# Patient Record
Sex: MALE | Race: WHITE | Employment: PART TIME | URBAN - METROPOLITAN AREA
[De-identification: names, ages, dates, MRNs, and addresses within clinical notes are randomized per-mention and may not be internally consistent; named-entity substitution may affect disease eponyms.]

---

## 2017-01-04 RX ORDER — VALSARTAN AND HYDROCHLOROTHIAZIDE 320; 25 MG/1; MG/1
TABLET, FILM COATED ORAL
Qty: 90 TAB | Refills: 0 | Status: SHIPPED | OUTPATIENT
Start: 2017-01-04 | End: 2017-04-02 | Stop reason: SDUPTHER

## 2017-01-16 ENCOUNTER — OFFICE VISIT (OUTPATIENT)
Dept: FAMILY MEDICINE CLINIC | Age: 57
End: 2017-01-16

## 2017-01-16 VITALS
DIASTOLIC BLOOD PRESSURE: 72 MMHG | SYSTOLIC BLOOD PRESSURE: 124 MMHG | OXYGEN SATURATION: 98 % | HEART RATE: 73 BPM | HEIGHT: 72 IN | BODY MASS INDEX: 30.07 KG/M2 | TEMPERATURE: 98.8 F | RESPIRATION RATE: 16 BRPM | WEIGHT: 222 LBS

## 2017-01-16 DIAGNOSIS — K21.9 GASTROESOPHAGEAL REFLUX DISEASE, ESOPHAGITIS PRESENCE NOT SPECIFIED: ICD-10-CM

## 2017-01-16 DIAGNOSIS — C90.01 MULTIPLE MYELOMA IN REMISSION (HCC): ICD-10-CM

## 2017-01-16 DIAGNOSIS — Z12.5 PROSTATE CANCER SCREENING: ICD-10-CM

## 2017-01-16 DIAGNOSIS — I10 ESSENTIAL HYPERTENSION: Primary | ICD-10-CM

## 2017-01-16 DIAGNOSIS — E03.9 ACQUIRED HYPOTHYROIDISM: ICD-10-CM

## 2017-01-16 RX ORDER — LEVOTHYROXINE SODIUM 25 UG/1
25 TABLET ORAL DAILY
COMMUNITY
Start: 2016-11-18 | End: 2017-05-02

## 2017-01-16 RX ORDER — ASPIRIN 81 MG/1
81 TABLET ORAL DAILY
COMMUNITY

## 2017-01-16 RX ORDER — LENALIDOMIDE 25 MG/1
25 CAPSULE ORAL DAILY
COMMUNITY
Start: 2017-01-05

## 2017-01-16 RX ORDER — TRIAMTERENE/HYDROCHLOROTHIAZID 37.5-25 MG
0.5 TABLET ORAL DAILY
Qty: 45 TAB | Refills: 1 | Status: SHIPPED | OUTPATIENT
Start: 2017-01-16 | End: 2017-05-02

## 2017-01-16 RX ORDER — ACYCLOVIR 400 MG/1
400 TABLET ORAL 2 TIMES DAILY
COMMUNITY
Start: 2017-01-05

## 2017-01-16 RX ORDER — TRIAMTERENE/HYDROCHLOROTHIAZID 37.5-25 MG
TABLET ORAL
COMMUNITY
Start: 2016-11-18 | End: 2017-01-16

## 2017-01-16 NOTE — MR AVS SNAPSHOT
Visit Information Date & Time Provider Department Dept. Phone Encounter #  
 1/16/2017  8:30 AM Ciara Turk MD [address] [place] [address] Road [id number] Follow-up Instructions Return in about 6 months (around 7/16/2017) for  routine care and please have non-fasting labs done 1 week prior. Upcoming Health Maintenance Date Due Pneumococcal 19-64 Highest Risk (1 of 3 - PCV13) 12/10/1978 COLONOSCOPY 3/8/2017 DTaP/Tdap/Td series (2 - Td) 7/6/2025 Allergies as of 1/16/2017  Review Complete On: 1/16/2017 By: Brad Rank Severity Noted Reaction Type Reactions Ativan [Lorazepam]  01/26/2016    Other (comments) Suicidal thoughts Current Immunizations  Reviewed on 7/5/2016 Name Date Tdap 7/6/2015 Not reviewed this visit You Were Diagnosed With   
  
 Codes Comments Essential hypertension    -  Primary ICD-10-CM: I10 
ICD-9-CM: 401.9 Acquired hypothyroidism     ICD-10-CM: E03.9 ICD-9-CM: 244.9 Multiple myeloma in remission ([facility] [address]     ICD-10-CM: C90.01 
ICD-9-CM: 203.01 Gastroesophageal reflux disease, esophagitis presence not specified     ICD-10-CM: K21.9 ICD-9-CM: 530.81 Prostate cancer screening     ICD-10-CM: Z12.5 ICD-9-CM: V76.44 Vitals BP Pulse Temp Resp Height(growth percentile) Weight(growth percentile) 124/72 (BP 1 Location: Left arm, BP Patient Position: Sitting) 73 98.8 °F (37.1 °C) (Oral) 16 6' (1.829 m) 222 lb (100.7 kg) SpO2 BMI Smoking Status 98% 30.11 kg/m2 Former Smoker Vitals History BMI and BSA Data Body Mass Index Body Surface Area  
 30.11 kg/m 2 2.26 m 2 Preferred Pharmacy Pharmacy Name Phone [address] [facility] [facility] [phone] Your Updated Medication List  
  
   
This list is accurate as of: 1/16/17  9:09 AM.  Always use your most recent med list.  
  
  
  
  
 acyclovir 400 mg tablet Commonly known as:  ZOVIRAX Take 400 mg by mouth two (2) times a day. amitriptyline 25 mg tablet Commonly known as:  ELAVIL Take 25 mg by mouth nightly. aspirin delayed-release 81 mg tablet Take 81 mg by mouth daily. HYDROcodone-acetaminophen 7.5-325 mg per tablet Commonly known as:  Pelon Songster Take 1-2 Tabs by mouth every six (6) hours as needed. lansoprazole 15 mg capsule Commonly known as:  PREVACID Take  by mouth two (2) times a day. levothyroxine 25 mcg tablet Commonly known as:  SYNTHROID Take 25 mcg by mouth daily. P-COL RITE 8.6-50 mg per tablet Generic drug:  senna-docusate Take 1 Tab by mouth daily. REVLIMID 25 mg Cap Generic drug:  lenalidomide Take 25 mg by mouth daily. tiZANidine 4 mg tablet Commonly known as:  Tara Mail Take 4 mg by mouth two (2) times daily as needed. triamterene-hydroCHLOROthiazide 37.5-25 mg per tablet Commonly known as:  Jose Nabila Take 0.5 Tabs by mouth daily. valsartan-hydroCHLOROthiazide 320-25 mg per tablet Commonly known as:  DIOVAN-HCT  
TAKE 1 TABLET DAILY Prescriptions Printed Refills  
 triamterene-hydroCHLOROthiazide (MAXZIDE) 37.5-25 mg per tablet 1 Sig: Take 0.5 Tabs by mouth daily. Class: Print Route: Oral  
  
Follow-up Instructions Return in about 6 months (around 7/16/2017) for  routine care and please have non-fasting labs done 1 week prior. To-Do List   
 01/16/2017 Lab:  CBC W/O DIFF   
  
 01/16/2017 Lab:  METABOLIC PANEL, COMPREHENSIVE   
  
 01/16/2017 Lab:  PSA DIAGNOSTIC (PROSTATIC SPECIFIC AG)   
  
 01/16/2017 Lab:  TSH 3RD GENERATION Patient Instructions A Healthy Lifestyle: Care Instructions Your Care Instructions A healthy lifestyle can help you feel good, stay at a healthy weight, and have plenty of energy for both work and play.  A healthy lifestyle is something you can share with your whole family. A healthy lifestyle also can lower your risk for serious health problems, such as high blood pressure, heart disease, and diabetes. You can follow a few steps listed below to improve your health and the health of your family. Follow-up care is a key part of your treatment and safety. Be sure to make and go to all appointments, and call your doctor if you are having problems. Its also a good idea to know your test results and keep a list of the medicines you take. How can you care for yourself at home? · Do not eat too much sugar, fat, or fast foods. You can still have dessert and treats now and then. The goal is moderation. · Start small to improve your eating habits. Pay attention to portion sizes, drink less juice and soda pop, and eat more fruits and vegetables. ¨ Eat a healthy amount of food. A 3-ounce serving of meat, for example, is about the size of a deck of cards. Fill the rest of your plate with vegetables and whole grains. ¨ Limit the amount of soda and sports drinks you have every day. Drink more water when you are thirsty. ¨ Eat at least 5 servings of fruits and vegetables every day. It may seem like a lot, but it is not hard to reach this goal. A serving or helping is 1 piece of fruit, 1 cup of vegetables, or 2 cups of leafy, raw vegetables. Have an apple or some carrot sticks as an afternoon snack instead of a candy bar. Try to have fruits and/or vegetables at every meal. 
· Make exercise part of your daily routine. You may want to start with simple activities, such as walking, bicycling, or slow swimming. Try to be active 30 to 60 minutes every day. You do not need to do all 30 to 60 minutes all at once. For example, you can exercise 3 times a day for 10 or 20 minutes.  Moderate exercise is safe for most people, but it is always a good idea to talk to your doctor before starting an exercise program. 
 · Keep moving. [place] the lawn, work in the garden, or Harry and David. Take the stairs instead of the elevator at work. · If you smoke, quit. People who smoke have an increased risk for heart attack, stroke, cancer, and other lung illnesses. Quitting is hard, but there are ways to boost your chance of quitting tobacco for good. ¨ Use nicotine gum, patches, or lozenges. ¨ Ask your doctor about stop-smoking programs and medicines. ¨ Keep trying. In addition to reducing your risk of diseases in the future, you will notice some benefits soon after you stop using tobacco. If you have shortness of breath or asthma symptoms, they will likely get better within a few weeks after you quit. · Limit how much alcohol you drink. Moderate amounts of alcohol (up to 2 drinks a day for men, 1 drink a day for women) are okay. But drinking too much can lead to liver problems, high blood pressure, and other health problems. Family health If you have a family, there are many things you can do together to improve your health. · Eat meals together as a family as often as possible. · Eat healthy foods. This includes fruits, vegetables, lean meats and dairy, and whole grains. · Include your family in your fitness plan. Most people think of activities such as jogging or tennis as the way to fitness, but there are many ways you and your family can be more active. Anything that makes you breathe hard and gets your heart pumping is exercise. Here are some tips: 
¨ Walk to do errands or to take your child to school or the bus. ¨ Go for a family bike ride after dinner instead of watching TV. Where can you learn more? Go to http://moises-venus.info/. Enter Q329 in the search box to learn more about \"A Healthy Lifestyle: Care Instructions. \" Current as of: July 26, 2016 Content Version: 11.1 © 8502-9591 Mirimus, Incorporated.  Care instructions adapted under license by Delilah Francisco (which disclaims liability or warranty for this information). If you have questions about a medical condition or this instruction, always ask your healthcare professional. [address] any warranty or liability for your use of this information. Follow up in 6 months for routine care and please have non-fasting labs done 1 week prior Introducing [facility]! Dear Yogi Reyes: Thank you for requesting a [facility] account. Our records indicate that you already have an active [facility] account. You can access your account anytime at [email or web address] Did you know that you can access your hospital and ER discharge instructions at any time in [facility]? You can also review all of your test results from your hospital stay or ER visit. Additional Information If you have questions, please visit the Frequently Asked Questions section of the [facility] website at [email or web address] Remember, [facility] is NOT to be used for urgent needs. For medical emergencies, dial 911. Now available from your iPhone and Android! Please provide this summary of care documentation to your next provider. Your primary care clinician is listed as Samuel Patton. If you have any questions after today's visit, please call [phone].

## 2017-01-16 NOTE — PROGRESS NOTES
SUBJECTIVE  Chief Complaint   Patient presents with    Other     multiple myeloma     Hypertension    Thyroid Problem     Here for general follow-up. He is under the care of Dr. Villa Chacon for multiple myeloma. He says that he is in remission. He has minimal pain. He is taking meds for such. He is having q6 week infusions. He had swelling and was taken of Norvasc. His oncologist started him on Maxzide. He is already on Diovan / HCT. He says that he was found to have an elevated TSH and was placed on Synthroid. We reviewed their cardiac risk factors. The patient has been taking medications as prescribed regularly and denies any side effects with the medication. The patient denies any chest pain or chest tightness. Denies any dyspnea upon exertion. He also says that he has had GERD well controlled on chronic PPI therapy. OBJECTIVE    Blood pressure 124/72, pulse 73, temperature 98.8 °F (37.1 °C), temperature source Oral, resp. rate 16, height 6' (1.829 m), weight 222 lb (100.7 kg), SpO2 98 %. General:  Alert, cooperative, well appearing, in no apparent distress. CV:  The heart sounds are regular in rate and rhythm. There is a normal S1 and S2. There or no murmurs. Lungs: Inspiratory and expiratory efforts are full and unlabored. Lung sounds are clear and equal to auscultation throughout all lung fields without wheezing, rales, or rhonchi. Ext: no swelling. Psych: normal affect. Mood good. Oriented x 3. Judgement and insight intact.      Results for orders placed or performed during the hospital encounter of 09/06/16   SPEP AND ARSALAN, SERUM   Result Value Ref Range    Immunoglobulin G, Qt. 913 700 - 1600 mg/dL    Immunoglobulin A, Qt. 97 90 - 386 mg/dL    Immunoglobulin M, Qt. 30 20 - 172 mg/dL    Protein, total 5.7 (L) 6.0 - 8.5 g/dL    Albumin 3.1 2.9 - 4.4 g/dL    Alpha-1-Globulin 0.2 0.0 - 0.4 g/dL    Alpha-2-Globulin 0.6 0.4 - 1.0 g/dL    Beta Globulin 0.9 0.7 - 1.3 g/dL    Gamma Globulin 0.8 0.4 - 1.8 g/dL    M-Jesus 0.6 (H) Not Observed g/dL    Globulin, total 2.6 2.2 - 3.9 g/dL    A/G ratio 1.2 0.7 - 1.7      Immunofixation Result Comment     PROTEIN, TOTAL   Result Value Ref Range    Protein, total 6.1 (L) 6.4 - 8.2 g/dL   PROTEIN ELECTROPHORESIS, URINE RANDOM   Result Value Ref Range    Protein, urine random 18.5 Not Estab. mg/dL    Albumin, urine 23.3 %    Alpha-1-Globulin, urine 3.0 %    Alpha-2-Globulin, urine 11.8 %    Beta Globulin, urine 39.3 %    Gamma Globulin, urine 22.7 %    M-Jesus, % 18.9 (H) Not Observed %    Please note Comment       ASSESSMENT / PLAN    ICD-10-CM ICD-9-CM    1. Essential hypertension H14 363.5 METABOLIC PANEL, COMPREHENSIVE      CBC W/O DIFF   2. Acquired hypothyroidism E03.9 244.9 TSH 3RD GENERATION   3. Multiple myeloma in remission (HCC) G81.59 253.83 METABOLIC PANEL, COMPREHENSIVE      CBC W/O DIFF   4. Gastroesophageal reflux disease, esophagitis presence not specified K21.9 530.81    5. Prostate cancer screening Z12.5 V76.44 PSA DIAGNOSTIC (PROSTATIC SPECIFIC AG)     HTN - well controlled. Continue current care but we will cut his Maxzide in half and recheck his BP in 6 months. Hypothyroid - TSH in 6 months. He wants to get off as many meds as possible. We can see what his TSH is. Multiple myeloma - continue per oncology. GERD - continue PPI. All chart history elements were reviewed by me at the time of the visit even though marked at time of note closure. Patient understands our medical plan. Patient has provided input and agrees with goals. Alternatives have been explained and offered. All questions answered. The patient is to call if condition worsens or fails to improve. Follow-up Disposition:  Return in about 6 months (around 7/16/2017) for  routine care and please have non-fasting labs done 1 week prior.

## 2017-01-16 NOTE — PATIENT INSTRUCTIONS

## 2017-01-16 NOTE — PROGRESS NOTES
1. Have you been to the ER, urgent care clinic since your last visit? Hospitalized since your last visit? No    2. Have you seen or consulted any other health care providers outside of the [address] [place] [address] since your last visit? Include any pap smears or colon screening. Yes When: 09- Where: Dr. Ewelina Buck (Neuro)  Reason for visit: follow peripheral neuropathy and on 12- Hematology/Oncology (Dr. Wan Cho) for folllow up mutiple myeloma     3. Yogi Reyes declined yearly influenza vaccine and he no longer is taken amlodipine 10 mg, decadron 4 mg, and P-Col Rite 8.6-50 mg.  Please remove from medication list.

## 2017-04-04 RX ORDER — VALSARTAN AND HYDROCHLOROTHIAZIDE 320; 25 MG/1; MG/1
TABLET, FILM COATED ORAL
Qty: 90 TAB | Refills: 0 | Status: SHIPPED | OUTPATIENT
Start: 2017-04-04 | End: 2017-07-03 | Stop reason: SDUPTHER

## 2017-05-02 ENCOUNTER — OFFICE VISIT (OUTPATIENT)
Dept: FAMILY MEDICINE CLINIC | Age: 57
End: 2017-05-02

## 2017-05-02 ENCOUNTER — HOSPITAL ENCOUNTER (OUTPATIENT)
Dept: GENERAL RADIOLOGY | Age: 57
Discharge: HOME | End: 2017-05-02
Payer: OTHER GOVERNMENT

## 2017-05-02 VITALS
WEIGHT: 208 LBS | TEMPERATURE: 98.4 F | DIASTOLIC BLOOD PRESSURE: 68 MMHG | OXYGEN SATURATION: 97 % | SYSTOLIC BLOOD PRESSURE: 118 MMHG | HEART RATE: 75 BPM | HEIGHT: 72 IN | RESPIRATION RATE: 14 BRPM | BODY MASS INDEX: 28.17 KG/M2

## 2017-05-02 DIAGNOSIS — C90.01 MULTIPLE MYELOMA IN REMISSION (HCC): ICD-10-CM

## 2017-05-02 DIAGNOSIS — D72.819 LEUKOPENIA, UNSPECIFIED TYPE: ICD-10-CM

## 2017-05-02 DIAGNOSIS — J40 BRONCHITIS: ICD-10-CM

## 2017-05-02 DIAGNOSIS — J40 BRONCHITIS: Primary | ICD-10-CM

## 2017-05-02 PROCEDURE — 71020 XR CHEST PA LAT: CPT

## 2017-05-02 RX ORDER — AZITHROMYCIN 250 MG/1
TABLET, FILM COATED ORAL
Qty: 6 TAB | Refills: 0 | Status: SHIPPED | OUTPATIENT
Start: 2017-05-02 | End: 2017-05-07

## 2017-05-02 RX ORDER — ALBUTEROL SULFATE 90 UG/1
2 AEROSOL, METERED RESPIRATORY (INHALATION)
Qty: 1 INHALER | Refills: 0 | Status: SHIPPED | OUTPATIENT
Start: 2017-05-02 | End: 2018-09-13

## 2017-05-02 NOTE — MR AVS SNAPSHOT
Visit Information Date & Time Provider Department Dept. Phone Encounter #  
 5/2/2017  3:45 PM Kameron Sanders, [address] [id number] Follow-up Instructions Return in about 6 weeks (around 6/16/2017) for  routine care and please have chest x-ray done today (05-). Upcoming Health Maintenance Date Due Pneumococcal 19-64 Highest Risk (1 of 3 - PCV13) 12/10/1978 COLONOSCOPY 3/8/2017 INFLUENZA AGE 9 TO ADULT 8/1/2017 DTaP/Tdap/Td series (2 - Td) 7/6/2025 Allergies as of 5/2/2017  Review Complete On: 5/2/2017 By: Kameron Sanders MD  
  
 Severity Noted Reaction Type Reactions Ativan [Lorazepam]  01/26/2016    Other (comments) Suicidal thoughts Current Immunizations  Reviewed on 7/5/2016 Name Date Tdap 7/6/2015 Not reviewed this visit You Were Diagnosed With   
  
 Codes Comments Bronchitis    -  Primary ICD-10-CM: T01 ICD-9-CM: 839 Multiple myeloma in remission ([facility] [address]     ICD-10-CM: C90.01 
ICD-9-CM: 203.01 Leukopenia, unspecified type     ICD-10-CM: D72.819 ICD-9-CM: 288.50 Vitals BP Pulse Temp Resp Height(growth percentile) Weight(growth percentile)  
 118/68 (BP 1 Location: Left arm, BP Patient Position: Sitting) 75 98.4 °F (36.9 °C) (Oral) 14 6' (1.829 m) 208 lb (94.3 kg) SpO2 BMI Smoking Status 97% 28.21 kg/m2 Former Smoker Vitals History BMI and BSA Data Body Mass Index Body Surface Area  
 28.21 kg/m 2 2.19 m 2 Preferred Pharmacy Pharmacy Name Phone [facility], [address] [phone] Your Updated Medication List  
  
   
This list is accurate as of: 5/2/17  4:16 PM.  Always use your most recent med list.  
  
  
  
  
 acyclovir 400 mg tablet Commonly known as:  ZOVIRAX Take 400 mg by mouth two (2) times a day. albuterol 90 mcg/actuation inhaler Commonly known as:  PROVENTIL HFA, VENTOLIN HFA, PROAIR HFA Take 2 Puffs by inhalation every four (4) hours as needed for Wheezing. amitriptyline 25 mg tablet Commonly known as:  ELAVIL Take 50 mg by mouth nightly. aspirin delayed-release 81 mg tablet Take 81 mg by mouth daily. azithromycin 250 mg tablet Commonly known as:  Silvia Mare Take 2 tablets today, then take 1 tablet daily HYDROcodone-acetaminophen 7.5-325 mg per tablet Commonly known as:  Sy Park Take 1-2 Tabs by mouth every six (6) hours as needed. lansoprazole 15 mg capsule Commonly known as:  PREVACID Take  by mouth two (2) times a day. REVLIMID 25 mg Cap Generic drug:  lenalidomide Take 25 mg by mouth daily. tiZANidine 4 mg tablet Commonly known as:  Vaughn Joseph Take 4 mg by mouth two (2) times daily as needed. valsartan-hydroCHLOROthiazide 320-25 mg per tablet Commonly known as:  DIOVAN-HCT  
TAKE 1 TABLET DAILY Prescriptions Sent to Pharmacy Refills  
 azithromycin (ZITHROMAX) 250 mg tablet 0 Sig: Take 2 tablets today, then take 1 tablet daily Class: Normal  
 Pharmacy: [facility] [place] [address] Ph #: [phone]  
 albuterol (PROVENTIL HFA, VENTOLIN HFA, PROAIR HFA) 90 mcg/actuation inhaler 0 Sig: Take 2 Puffs by inhalation every four (4) hours as needed for Wheezing. Class: Normal  
 Pharmacy: [id number] [address] [place] [address] Ph #: [phone] Route: Inhalation Follow-up Instructions Return in about 6 weeks (around 6/16/2017) for  routine care and please have chest x-ray done today (05-). To-Do List   
 05/02/2017 Imaging:  XR CHEST PA LAT Patient Instructions Bronchitis: Care Instructions Your Care Instructions Bronchitis is inflammation of the bronchial tubes, which carry air to the lungs. The tubes swell and produce mucus, or phlegm. The mucus and inflamed bronchial tubes make you cough. You may have trouble breathing. Most cases of bronchitis are caused by viruses like those that cause colds. Antibiotics usually do not help and they may be harmful. Bronchitis usually develops rapidly and lasts about 2 to 3 weeks in otherwise healthy people. Follow-up care is a key part of your treatment and safety. Be sure to make and go to all appointments, and call your doctor if you are having problems. It's also a good idea to know your test results and keep a list of the medicines you take. How can you care for yourself at home? · Take all medicines exactly as prescribed. Call your doctor if you think you are having a problem with your medicine. · Get some extra rest. 
· Take an over-the-counter pain medicine, such as acetaminophen (Tylenol), ibuprofen (Advil, Motrin), or naproxen (Aleve) to reduce fever and relieve body aches. Read and follow all instructions on the label. · Do not take two or more pain medicines at the same time unless the doctor told you to. Many pain medicines have acetaminophen, which is Tylenol. Too much acetaminophen (Tylenol) can be harmful. · Take an over-the-counter cough medicine that contains dextromethorphan to help quiet a dry, hacking cough so that you can sleep. Avoid cough medicines that have more than one active ingredient. Read and follow all instructions on the label. · Breathe moist air from a humidifier, hot shower, or sink filled with hot water. The heat and moisture will thin mucus so you can cough it out. · Do not smoke. Smoking can make bronchitis worse. If you need help quitting, talk to your doctor about stop-smoking programs and medicines. These can increase your chances of quitting for good. When should you call for help? Call 911 anytime you think you may need emergency care. For example, call if: 
· You have severe trouble breathing. Call your doctor now or seek immediate medical care if: 
· You have new or worse trouble breathing. · You cough up dark brown or bloody mucus (sputum). · You have a new or higher fever. · You have a new rash. Watch closely for changes in your health, and be sure to contact your doctor if: 
· You cough more deeply or more often, especially if you notice more mucus or a change in the color of your mucus. · You are not getting better as expected. Where can you learn more? Go to http://moises-venus.info/. Enter H333 in the search box to learn more about \"Bronchitis: Care Instructions. \" Current as of: May 23, 2016 Content Version: 11.2 © 1129-6781 SuiteLinq. Care instructions adapted under license by Slingbox (which disclaims liability or warranty for this information). If you have questions about a medical condition or this instruction, always ask your healthcare professional. [place] [address] any warranty or liability for your use of this information. Bronchitis: Care Instructions Your Care Instructions Bronchitis is inflammation of the bronchial tubes, which carry air to the lungs. The tubes swell and produce mucus, or phlegm. The mucus and inflamed bronchial tubes make you cough. You may have trouble breathing. Most cases of bronchitis are caused by viruses like those that cause colds. Antibiotics usually do not help and they may be harmful. Bronchitis usually develops rapidly and lasts about 2 to 3 weeks in otherwise healthy people. Follow-up care is a key part of your treatment and safety. Be sure to make and go to all appointments, and call your doctor if you are having problems. It's also a good idea to know your test results and keep a list of the medicines you take. How can you care for yourself at home? · Take all medicines exactly as prescribed.  Call your doctor if you think you are having a problem with your medicine. · Get some extra rest. 
· Take an over-the-counter pain medicine, such as acetaminophen (Tylenol), ibuprofen (Advil, Motrin), or naproxen (Aleve) to reduce fever and relieve body aches. Read and follow all instructions on the label. · Do not take two or more pain medicines at the same time unless the doctor told you to. Many pain medicines have acetaminophen, which is Tylenol. Too much acetaminophen (Tylenol) can be harmful. · Take an over-the-counter cough medicine that contains dextromethorphan to help quiet a dry, hacking cough so that you can sleep. Avoid cough medicines that have more than one active ingredient. Read and follow all instructions on the label. · Breathe moist air from a humidifier, hot shower, or sink filled with hot water. The heat and moisture will thin mucus so you can cough it out. · Do not smoke. Smoking can make bronchitis worse. If you need help quitting, talk to your doctor about stop-smoking programs and medicines. These can increase your chances of quitting for good. When should you call for help? Call 911 anytime you think you may need emergency care. For example, call if: 
· You have severe trouble breathing. Call your doctor now or seek immediate medical care if: 
· You have new or worse trouble breathing. · You cough up dark brown or bloody mucus (sputum). · You have a new or higher fever. · You have a new rash. Watch closely for changes in your health, and be sure to contact your doctor if: 
· You cough more deeply or more often, especially if you notice more mucus or a change in the color of your mucus. · You are not getting better as expected. Where can you learn more? Go to http://moises-venus.info/. Enter H333 in the search box to learn more about \"Bronchitis: Care Instructions. \" Current as of: May 23, 2016 Content Version: 11.2 © 8759-7536 Healthwise, [employer]peni[employer]. Care instructions adapted under license by Qlue (which disclaims liability or warranty for this information). If you have questions about a medical condition or this instruction, always ask your healthcare professional. [address] any warranty or liability for your use of this information. Follow up late June of 2017 routine care and please have chest x-ray done today (05-) Introducing [facility]! Dear Codie Paz: Thank you for requesting a Cont[employer] account. Our records indicate that you already have an active [facility] account. You can access your account anytime at [email or web address] Did you know that you can access your hospital and ER discharge instructions at any time in Cont[employer]? You can also review all of your test results from your hospital stay or ER visit. Additional Information If you have questions, please visit the Frequently Asked Questions section of the Cont[employer] website at [email or web address] Remember, [employer] is NOT to be used for urgent needs. For medical emergencies, dial 911. Now available from your iPhone and Android! Please provide this summary of care documentation to your next provider. Your primary care clinician is listed as Crystal Schulte. If you have any questions after today's visit, please call [phone].

## 2017-05-02 NOTE — PATIENT INSTRUCTIONS
Bronchitis: Care Instructions  Your Care Instructions    Bronchitis is inflammation of the bronchial tubes, which carry air to the lungs. The tubes swell and produce mucus, or phlegm. The mucus and inflamed bronchial tubes make you cough. You may have trouble breathing. Most cases of bronchitis are caused by viruses like those that cause colds. Antibiotics usually do not help and they may be harmful. Bronchitis usually develops rapidly and lasts about 2 to 3 weeks in otherwise healthy people. Follow-up care is a key part of your treatment and safety. Be sure to make and go to all appointments, and call your doctor if you are having problems. It's also a good idea to know your test results and keep a list of the medicines you take. How can you care for yourself at home? · Take all medicines exactly as prescribed. Call your doctor if you think you are having a problem with your medicine. · Get some extra rest.  · Take an over-the-counter pain medicine, such as acetaminophen (Tylenol), ibuprofen (Advil, Motrin), or naproxen (Aleve) to reduce fever and relieve body aches. Read and follow all instructions on the label. · Do not take two or more pain medicines at the same time unless the doctor told you to. Many pain medicines have acetaminophen, which is Tylenol. Too much acetaminophen (Tylenol) can be harmful. · Take an over-the-counter cough medicine that contains dextromethorphan to help quiet a dry, hacking cough so that you can sleep. Avoid cough medicines that have more than one active ingredient. Read and follow all instructions on the label. · Breathe moist air from a humidifier, hot shower, or sink filled with hot water. The heat and moisture will thin mucus so you can cough it out. · Do not smoke. Smoking can make bronchitis worse. If you need help quitting, talk to your doctor about stop-smoking programs and medicines. These can increase your chances of quitting for good.   When should you call for help? Call 911 anytime you think you may need emergency care. For example, call if:  · You have severe trouble breathing. Call your doctor now or seek immediate medical care if:  · You have new or worse trouble breathing. · You cough up dark brown or bloody mucus (sputum). · You have a new or higher fever. · You have a new rash. Watch closely for changes in your health, and be sure to contact your doctor if:  · You cough more deeply or more often, especially if you notice more mucus or a change in the color of your mucus. · You are not getting better as expected. Where can you learn more? Go to http://moises-venus.info/. Enter H333 in the search box to learn more about \"Bronchitis: Care Instructions. \"  Current as of: May 23, 2016  Content Version: 11.2  © 3251-0447 [employer]. Care instructions adapted under license by [employer] (which disclaims liability or warranty for this information). If you have questions about a medical condition or this instruction, always ask your healthcare professional. [place] [address] any warranty or liability for your use of this information. Bronchitis: Care Instructions  Your Care Instructions    Bronchitis is inflammation of the bronchial tubes, which carry air to the lungs. The tubes swell and produce mucus, or phlegm. The mucus and inflamed bronchial tubes make you cough. You may have trouble breathing. Most cases of bronchitis are caused by viruses like those that cause colds. Antibiotics usually do not help and they may be harmful. Bronchitis usually develops rapidly and lasts about 2 to 3 weeks in otherwise healthy people. Follow-up care is a key part of your treatment and safety. Be sure to make and go to all appointments, and call your doctor if you are having problems. It's also a good idea to know your test results and keep a list of the medicines you take.   How can you care for yourself at home? · Take all medicines exactly as prescribed. Call your doctor if you think you are having a problem with your medicine. · Get some extra rest.  · Take an over-the-counter pain medicine, such as acetaminophen (Tylenol), ibuprofen (Advil, Motrin), or naproxen (Aleve) to reduce fever and relieve body aches. Read and follow all instructions on the label. · Do not take two or more pain medicines at the same time unless the doctor told you to. Many pain medicines have acetaminophen, which is Tylenol. Too much acetaminophen (Tylenol) can be harmful. · Take an over-the-counter cough medicine that contains dextromethorphan to help quiet a dry, hacking cough so that you can sleep. Avoid cough medicines that have more than one active ingredient. Read and follow all instructions on the label. · Breathe moist air from a humidifier, hot shower, or sink filled with hot water. The heat and moisture will thin mucus so you can cough it out. · Do not smoke. Smoking can make bronchitis worse. If you need help quitting, talk to your doctor about stop-smoking programs and medicines. These can increase your chances of quitting for good. When should you call for help? Call 911 anytime you think you may need emergency care. For example, call if:  · You have severe trouble breathing. Call your doctor now or seek immediate medical care if:  · You have new or worse trouble breathing. · You cough up dark brown or bloody mucus (sputum). · You have a new or higher fever. · You have a new rash. Watch closely for changes in your health, and be sure to contact your doctor if:  · You cough more deeply or more often, especially if you notice more mucus or a change in the color of your mucus. · You are not getting better as expected. Where can you learn more? Go to http://moises-venus.info/. Enter H333 in the search box to learn more about \"Bronchitis: Care Instructions. \"  Current as of: May 23, 2016  Content Version: 11.2  © 1104-3507 [employer]. Care instructions adapted under license by [employer] (which disclaims liability or warranty for this information). If you have questions about a medical condition or this instruction, always ask your healthcare professional. [address] any warranty or liability for your use of this information.   Follow up late June of 2017 routine care and please have chest x-ray done today (05-)

## 2017-05-02 NOTE — PROGRESS NOTES
SUBJECTIVE  Chief Complaint   Patient presents with    Cough     productive at times     Fatigue     The patient presents complaining of a recurrent history of cough. The cough is described as present for 2 days and productive of thick mucus occasionally. The patient does have nasal congestion and drainage. The patient has ongoing sinus issues and performs nasal saline rinses. The patient reports intermittent fevers. The patient denies shortness of breath, chest pain, or wheezing but has some chest tightness with coughing. The patient has tried nasal saline. He feels overall tired / fatigued. Has had low WBC counts and had his Revlimid held. OBJECTIVE    Blood pressure 118/68, pulse 75, temperature 98.4 °F (36.9 °C), temperature source Oral, resp. rate 14, height 6' (1.829 m), weight 208 lb (94.3 kg), SpO2 97 %. General:  Alert, cooperative, well appearing, in no apparent distress. Eyes The left conjunctiva is clear and noninjected. ENT:  The nasal turbinates are engorged with scant clear drainage. The tympanic membranes and external auditory canal are normal bilaterally. The tongue and mucous membranes are pink and moist without lesions. The pharynx is non-erythematous without exudates. There is no evidence of postnasal drainage. Neck:  supple without adenopathy. CV:  The heart sounds are regular in rate and rhythm. There is a normal S1 and S2. There or no murmurs. Lungs: Inspiratory and expiratory efforts are full and unlabored. Lung sounds are clear and equal to auscultation throughout all lung fields without rhonchi, wheezing or rales. Skin:  No rashes or jaundice. Psych: normal affect. Mood good. Oriented x 3. Judgement and insight intact. ASSESSMENT / PLAN    ICD-10-CM ICD-9-CM    1. Bronchitis J40 490 XR CHEST PA LAT   2. Multiple myeloma in remission (HCC) C90.01 203.01 XR CHEST PA LAT   3. Leukopenia, unspecified type D72.819 288.50 XR CHEST PA LAT     CXR ordered. Start Z-marti and albuterol. Monitor symptoms to resolution. Keep up with hem/onc. Monitor WBCs. All chart history elements were reviewed by me at the time of the visit even though marked at time of note closure. Patient understands our medical plan. Patient has provided input and agrees with goals. Alternatives have been explained and offered. All questions answered. The patient is to call if condition worsens or fails to improve. Follow-up Disposition:  Return in about 6 weeks (around 6/16/2017) for  routine care and please have chest x-ray done today (05-).

## 2017-05-02 NOTE — PROGRESS NOTES
Chief Complaint   Patient presents with    Cough     productive at times     Fatigue     1. Have you been to the ER, urgent care clinic since your last visit? Hospitalized since your last visit? No    2. Have you seen or consulted any other health care providers outside of the [address] [place] [address] since your last visit? Include any pap smears or colon screening.  Yes Where: Dr. Aiden Esquivel and [facility] in [place]

## 2017-05-03 NOTE — PROGRESS NOTES
Patient advised that CXR showed no signs of pneumonia and to take the antibiotics as prescribed. He voices understanding.

## 2017-05-03 NOTE — PROGRESS NOTES
Nurse to advise that the CXR shows no signs of pneumonia. I do advise that he takes the antibiotics still though.

## 2017-07-03 DIAGNOSIS — I10 ESSENTIAL HYPERTENSION: Primary | ICD-10-CM

## 2017-07-03 DIAGNOSIS — Z12.5 PROSTATE CANCER SCREENING: ICD-10-CM

## 2017-07-10 RX ORDER — VALSARTAN AND HYDROCHLOROTHIAZIDE 320; 25 MG/1; MG/1
TABLET, FILM COATED ORAL
Qty: 90 TAB | Refills: 0 | Status: SHIPPED | OUTPATIENT
Start: 2017-07-10 | End: 2017-07-18 | Stop reason: SDUPTHER

## 2017-07-10 NOTE — TELEPHONE ENCOUNTER
Spoke with patient. He was advised that 90 day supply of medication has been sent to pharmacy. Patient will have fasting labs done this week and follow up 7/18/17.

## 2017-07-14 ENCOUNTER — HOSPITAL ENCOUNTER (OUTPATIENT)
Dept: LAB | Age: 57
Discharge: HOME | End: 2017-07-14
Payer: OTHER GOVERNMENT

## 2017-07-14 DIAGNOSIS — I10 ESSENTIAL HYPERTENSION: ICD-10-CM

## 2017-07-14 LAB
ANION GAP BLD CALC-SCNC: 4 MMOL/L (ref 3–18)
BUN SERPL-MCNC: 14 MG/DL (ref 7–18)
BUN/CREAT SERPL: 14 (ref 12–20)
CALCIUM SERPL-MCNC: 8.6 MG/DL (ref 8.5–10.1)
CHLORIDE SERPL-SCNC: 100 MMOL/L (ref 100–108)
CHOLEST SERPL-MCNC: 158 MG/DL
CO2 SERPL-SCNC: 34 MMOL/L (ref 21–32)
CREAT SERPL-MCNC: 1.01 MG/DL (ref 0.6–1.3)
ERYTHROCYTE [DISTWIDTH] IN BLOOD BY AUTOMATED COUNT: 15.1 % (ref 11.6–14.5)
GLUCOSE SERPL-MCNC: 102 MG/DL (ref 74–99)
HCT VFR BLD AUTO: 41.5 % (ref 36–48)
HDLC SERPL-MCNC: 54 MG/DL (ref 40–60)
HDLC SERPL: 2.9 {RATIO} (ref 0–5)
HGB BLD-MCNC: 14.4 G/DL (ref 13–16)
LDLC SERPL CALC-MCNC: 83.8 MG/DL (ref 0–100)
LIPID PROFILE,FLP: NORMAL
MCH RBC QN AUTO: 31.4 PG (ref 24–34)
MCHC RBC AUTO-ENTMCNC: 34.7 G/DL (ref 31–37)
MCV RBC AUTO: 90.6 FL (ref 74–97)
PLATELET # BLD AUTO: 174 K/UL (ref 135–420)
PMV BLD AUTO: 9.9 FL (ref 9.2–11.8)
POTASSIUM SERPL-SCNC: 3.7 MMOL/L (ref 3.5–5.5)
PSA SERPL-MCNC: <0.1 NG/ML (ref 0–4)
RBC # BLD AUTO: 4.58 M/UL (ref 4.7–5.5)
SODIUM SERPL-SCNC: 138 MMOL/L (ref 136–145)
TRIGL SERPL-MCNC: 101 MG/DL (ref ?–150)
TSH SERPL DL<=0.05 MIU/L-ACNC: 1.29 UIU/ML (ref 0.36–3.74)
VLDLC SERPL CALC-MCNC: 20.2 MG/DL
WBC # BLD AUTO: 3.5 K/UL (ref 4.6–13.2)

## 2017-07-14 PROCEDURE — 84153 ASSAY OF PSA TOTAL: CPT | Performed by: FAMILY MEDICINE

## 2017-07-14 PROCEDURE — 36415 COLL VENOUS BLD VENIPUNCTURE: CPT | Performed by: FAMILY MEDICINE

## 2017-07-14 PROCEDURE — 80048 BASIC METABOLIC PNL TOTAL CA: CPT | Performed by: FAMILY MEDICINE

## 2017-07-14 PROCEDURE — 85027 COMPLETE CBC AUTOMATED: CPT | Performed by: FAMILY MEDICINE

## 2017-07-14 PROCEDURE — 84443 ASSAY THYROID STIM HORMONE: CPT | Performed by: FAMILY MEDICINE

## 2017-07-14 PROCEDURE — 80061 LIPID PANEL: CPT | Performed by: FAMILY MEDICINE

## 2017-07-15 LAB
PSA SERPL-MCNC: 0.1 NG/ML (ref 0–4)
REFLEX CRITERIA: NORMAL

## 2017-07-18 ENCOUNTER — OFFICE VISIT (OUTPATIENT)
Dept: FAMILY MEDICINE CLINIC | Age: 57
End: 2017-07-18

## 2017-07-18 VITALS
HEIGHT: 72 IN | DIASTOLIC BLOOD PRESSURE: 76 MMHG | OXYGEN SATURATION: 97 % | RESPIRATION RATE: 16 BRPM | WEIGHT: 217 LBS | BODY MASS INDEX: 29.39 KG/M2 | HEART RATE: 70 BPM | TEMPERATURE: 97.7 F | SYSTOLIC BLOOD PRESSURE: 120 MMHG

## 2017-07-18 DIAGNOSIS — R73.01 IFG (IMPAIRED FASTING GLUCOSE): ICD-10-CM

## 2017-07-18 DIAGNOSIS — C90.01 MULTIPLE MYELOMA IN REMISSION (HCC): ICD-10-CM

## 2017-07-18 DIAGNOSIS — I10 ESSENTIAL HYPERTENSION: Primary | ICD-10-CM

## 2017-07-18 DIAGNOSIS — J30.9 ALLERGIC RHINITIS, UNSPECIFIED ALLERGIC RHINITIS TRIGGER, UNSPECIFIED RHINITIS SEASONALITY: ICD-10-CM

## 2017-07-18 DIAGNOSIS — Z12.11 COLON CANCER SCREENING: ICD-10-CM

## 2017-07-18 DIAGNOSIS — E03.9 HYPOTHYROIDISM, UNSPECIFIED TYPE: ICD-10-CM

## 2017-07-18 DIAGNOSIS — R31.9 HEMATURIA: ICD-10-CM

## 2017-07-18 DIAGNOSIS — K21.9 GASTROESOPHAGEAL REFLUX DISEASE, ESOPHAGITIS PRESENCE NOT SPECIFIED: ICD-10-CM

## 2017-07-18 LAB
BILIRUB UR QL STRIP: NEGATIVE
GLUCOSE UR-MCNC: NEGATIVE MG/DL
HBA1C MFR BLD HPLC: 5.4 %
KETONES P FAST UR STRIP-MCNC: NEGATIVE MG/DL
PH UR STRIP: 5 [PH] (ref 4.6–8)
PROT UR QL STRIP: NEGATIVE MG/DL
SP GR UR STRIP: 1.02 (ref 1–1.03)
UA UROBILINOGEN AMB POC: NORMAL (ref 0.2–1)
URINALYSIS CLARITY POC: CLEAR
URINALYSIS COLOR POC: NORMAL
URINE BLOOD POC: NORMAL
URINE LEUKOCYTES POC: NEGATIVE
URINE NITRITES POC: NEGATIVE

## 2017-07-18 RX ORDER — LEVOTHYROXINE SODIUM 25 UG/1
TABLET ORAL
COMMUNITY
End: 2018-09-13

## 2017-07-18 RX ORDER — TRIAMTERENE/HYDROCHLOROTHIAZID 37.5-25 MG
TABLET ORAL DAILY
COMMUNITY
End: 2018-03-12 | Stop reason: ALTCHOICE

## 2017-07-18 RX ORDER — VALSARTAN AND HYDROCHLOROTHIAZIDE 320; 25 MG/1; MG/1
TABLET, FILM COATED ORAL
Qty: 90 TAB | Refills: 1 | Status: SHIPPED | OUTPATIENT
Start: 2017-07-18 | End: 2018-03-07 | Stop reason: SDUPTHER

## 2017-07-18 RX ORDER — ACYCLOVIR 400 MG/1
400 TABLET ORAL 2 TIMES DAILY
Status: CANCELLED | OUTPATIENT
Start: 2017-07-18

## 2017-07-18 NOTE — PATIENT INSTRUCTIONS

## 2017-07-18 NOTE — MR AVS SNAPSHOT
Visit Information Date & Time Provider Department Dept. Phone Encounter #  
 7/18/2017  2:45 PM Kirk Kirkland, [address] [id number] Follow-up Instructions Return in about 6 months (around 1/18/2018) for routine care. Upcoming Health Maintenance Date Due COLONOSCOPY 3/8/2017 INFLUENZA AGE 9 TO ADULT 8/1/2017 Pneumococcal 19-64 Highest Risk (2 of 3 - PCV13) 7/18/2018 DTaP/Tdap/Td series (2 - Td) 7/6/2025 Allergies as of 7/18/2017  Review Complete On: 7/18/2017 By: Kirk Kirkland MD  
  
 Severity Noted Reaction Type Reactions Ativan [Lorazepam]  01/26/2016    Other (comments) Suicidal thoughts Current Immunizations  Reviewed on 7/18/2017 Name Date Tdap 7/6/2015 Reviewed by Kirk Kirkland MD on 7/18/2017 at  3:09 PM  
You Were Diagnosed With   
  
 Codes Comments Essential hypertension    -  Primary ICD-10-CM: I10 
ICD-9-CM: 401.9 IFG (impaired fasting glucose)     ICD-10-CM: R73.01 
ICD-9-CM: 790.21 Hematuria     ICD-10-CM: R31.9 ICD-9-CM: 599.70 Multiple myeloma in remission ([facility] [address]     ICD-10-CM: C90.01 
ICD-9-CM: 203.01 Colon cancer screening     ICD-10-CM: Z12.11 ICD-9-CM: V76.51 Gastroesophageal reflux disease, esophagitis presence not specified     ICD-10-CM: K21.9 ICD-9-CM: 530.81 Allergic rhinitis, unspecified allergic rhinitis trigger, unspecified rhinitis seasonality     ICD-10-CM: J30.9 ICD-9-CM: 477.9 Vitals BP Pulse Temp Resp Height(growth percentile) Weight(growth percentile) 120/76 (BP 1 Location: Left arm, BP Patient Position: Sitting) 70 97.7 °F (36.5 °C) (Oral) 16 6' (1.829 m) 217 lb (98.4 kg) SpO2 BMI Smoking Status 97% 29.43 kg/m2 Former Smoker Vitals History BMI and BSA Data Body Mass Index Body Surface Area  
 29.43 kg/m 2 2.24 m 2 [phone] Your Updated Medication List  
  
   
This list is accurate as of: 7/18/17  3:11 PM.  Always use your most recent med list.  
  
  
  
  
 acyclovir 400 mg tablet Commonly known as:  ZOVIRAX Take 400 mg by mouth two (2) times a day. albuterol 90 mcg/actuation inhaler Commonly known as:  PROVENTIL HFA, VENTOLIN HFA, PROAIR HFA Take 2 Puffs by inhalation every four (4) hours as needed for Wheezing. amitriptyline 25 mg tablet Commonly known as:  ELAVIL Take 50 mg by mouth nightly. aspirin delayed-release 81 mg tablet Take 81 mg by mouth daily. HYDROcodone-acetaminophen 7.5-325 mg per tablet Commonly known as:  Sheldon Boot Take 1-2 Tabs by mouth every six (6) hours as needed. lansoprazole 15 mg capsule Commonly known as:  PREVACID Take 15 mg by mouth two (2) times a day. REVLIMID 25 mg Cap Generic drug:  lenalidomide Take 25 mg by mouth daily. 1 tab daily x 2 weeks then off for 1 week  
  
 tiZANidine 4 mg tablet Commonly known as:  Eugena Babinski Take 4 mg by mouth two (2) times daily as needed. valsartan-hydroCHLOROthiazide 320-25 mg per tablet Commonly known as:  DIOVAN-HCT  
TAKE 1 TABLET DAILY Prescriptions Sent to Pharmacy Refills  
 valsartan-hydroCHLOROthiazide (DIOVAN-HCT) 320-25 mg per tablet 1 Sig: TAKE 1 TABLET DAILY Class: Normal  
 Pharmacy: [address], [address] [place] [address] #: [phone] We Performed the Following AMB POC HEMOGLOBIN A1C [16521 CPT(R)] AMB POC URINALYSIS DIP STICK AUTO W/O MICRO [10677 CPT(R)] REFERRAL TO GASTROENTEROLOGY [QEZ12 Custom] Comments:  
 Please evaluate patient for CRCS Follow-up Instructions Return in about 6 months (around 1/18/2018) for routine care. Referral Information Referral ID Referred By Referred To [id number] [facility] Not Available Visits Status Start Date End Date 1 New Request 7/18/17 7/18/18 If your referral has a status of pending review or denied, additional information will be sent to support the outcome of this decision. Introducing [facility]! Dear Yvtete Contreras: Thank you for requesting a [email or web address] account. Our records indicate that you already have an active [email or web address] account. You can access your account anytime at [email or web address] Did you know that you can access your hospital and ER discharge instructions at any time in [email or web address]? You can also review all of your test results from your hospital stay or ER visit. Additional Information If you have questions, please visit the Frequently Asked Questions section of the [email or web address] website at [email or web address] Remember, [email or web address] is NOT to be used for urgent needs. For medical emergencies, dial 911. Now available from your iPhone and Android! Please provide this summary of care documentation to your next provider. Your primary care clinician is listed as Shelly Aguayo. If you have any questions after today's visit, please call [phone].

## 2017-07-18 NOTE — PROGRESS NOTES
SUBJECTIVE  Chief Complaint   Patient presents with    GERD    Hypertension    Results     review     Here for general follow-up, lab review, and med refills. We reviewed their cardiac risk factors. The patient has been taking medications as prescribed regularly and denies any side effects with the medication. The patient denies any chest pain or chest tightness. Denies any dyspnea upon exertion. He is under the care of Dr. Robin Hammonds for multiple myeloma. He has been on acyclovir as a preventative for outbreaks of a viral rash it sounds. He is taking meds for chronic pain per the med reconciliation. He had swelling on Norvasc so that was discontinued. His oncologist started him on Maxzide a while back which we were trying to wean at the prior visit. He says that he was found to have an elevated TSH and was placed on Synthroid as well. He also says that he has had GERD well controlled on chronic PPI therapy. OBJECTIVE    Blood pressure 120/76, pulse 70, temperature 97.7 °F (36.5 °C), temperature source Oral, resp. rate 16, height 6' (1.829 m), weight 217 lb (98.4 kg), SpO2 97 %. General:  Alert, cooperative, well appearing, in no apparent distress. CV:  The heart sounds are regular in rate and rhythm. There is a normal S1 and S2. There or no murmurs. Lungs: Inspiratory and expiratory efforts are full and unlabored. Lung sounds are clear and equal to auscultation throughout all lung fields without wheezing, rales, or rhonchi. Ext: no swelling. Psych: normal affect. Mood good. Oriented x 3. Judgement and insight intact.      Results for orders placed or performed in visit on 07/18/17   AMB POC HEMOGLOBIN A1C   Result Value Ref Range    Hemoglobin A1c (POC) 5.4 %   AMB POC URINALYSIS DIP STICK AUTO W/O MICRO   Result Value Ref Range    Color (UA POC) Maureen     Clarity (UA POC) Clear     Glucose (UA POC) Negative Negative    Bilirubin (UA POC) Negative Negative    Ketones (UA POC) Negative Negative    Specific gravity (UA POC) 1.020 1.001 - 1.035    Blood (UA POC) 2+ Negative    pH (UA POC) 5.0 4.6 - 8.0    Protein (UA POC) Negative Negative mg/dL    Urobilinogen (UA POC) 0.2 mg/dL 0.2 - 1    Nitrites (UA POC) Negative Negative    Leukocyte esterase (UA POC) Negative Negative     ASSESSMENT / PLAN    ICD-10-CM ICD-9-CM    1. Essential hypertension I10 401.9 valsartan-hydroCHLOROthiazide (DIOVAN-HCT) 320-25 mg per tablet   2. IFG (impaired fasting glucose) R73.01 790.21 AMB POC HEMOGLOBIN A1C   3. Hematuria R31.9 599.70 AMB POC URINALYSIS DIP STICK AUTO W/O MICRO   4. Multiple myeloma in remission (HCC) C90.01 203.01    5. Colon cancer screening Z12.11 V76.51 REFERRAL TO GASTROENTEROLOGY   6. Gastroesophageal reflux disease, esophagitis presence not specified K21.9 530.81    7. Allergic rhinitis, unspecified allergic rhinitis trigger, unspecified rhinitis seasonality J30.9 477.9    8. Hypothyroidism, unspecified type E03.9 244.9      Reviewed labs. HTN - well controlled. After he left, I called him to clarify the discrepancy with my office plan and his med reconciliation. The question is if he is on Maxzide and what dose. And to clarify that he is on Diovan / HCTZ. We were attempting to wean him off the [facility]. He was already on that with the diovan/hctz but still the total dose was okay. IFG - A1c was added and normal.  Reviewed with patient. Hematuria- he mentioned that he was seen by another facility who mentioned hematuria and to have it rechecked. He mentions that he has had a work-up for this several years back and will never have another cystoscopy. He has had imaging as well since then. We decided together that we can defer the work-up due to prior work-up. Multiple myeloma - continue per oncology. GERD - continue PPI. Allergic rhinitis - doing well. Meds as needed. Hypothyroid - TSH is normal.  We also have to clarify if he is on Synthroid. Refer for CRCS. 25 minutes of face to face time spent with the patient with at least 50% on counseling on above medical issues. All chart history elements were reviewed by me at the time of the visit even though marked at time of note closure. Patient understands our medical plan. Patient has provided input and agrees with goals. Alternatives have been explained and offered. All questions answered. The patient is to call if condition worsens or fails to improve. Follow-up Disposition:  Return in about 6 months (around 1/18/2018) for routine care.

## 2017-07-18 NOTE — PROGRESS NOTES
Chief Complaint   Patient presents with    GERD    Hypertension    Results     review     1. Have you been to the ER, urgent care clinic since your last visit? Hospitalized since your last visit? Yes Where: [facility] x 1 month ago for urinary frequency and incomplete bladder emptying (dx with possible kidney stone)     2. Have you seen or consulted any other health care providers outside of the [address] [place] [address] since your last visit? Include any pap smears or colon screening. No    Due for colonoscopy.

## 2017-08-23 ENCOUNTER — PATIENT MESSAGE (OUTPATIENT)
Dept: FAMILY MEDICINE CLINIC | Age: 57
End: 2017-08-23

## 2017-09-08 NOTE — TELEPHONE ENCOUNTER
From: Kena Bwoles  To: Kevin Batista MD  Sent: 8/23/2017 5:06 PM EDT  Subject: Non-Urgent Medical Question    Dr. Corina Lazo, I'm sorry this has taken so long in getting back to you. 1. As far as the L-Thyroxine. Dr. Saran Lang took me off due to weight gain and I believe my numbers were good. I believe the other drug you inquired about was to help get rid of swelling in my legs and/ or lower my blood pressure. . I took my self off that one because my blood pressure kept bottoming out. Every time I stood up or bent over I would about pass out. My pressures have been great without it and my swelling in my legs are gone.

## 2017-12-15 ENCOUNTER — TELEPHONE (OUTPATIENT)
Dept: FAMILY MEDICINE CLINIC | Age: 57
End: 2017-12-15

## 2017-12-15 NOTE — TELEPHONE ENCOUNTER
Patient is requesting (New  Updated) referral to the following office:    Speciality: Hematology   Specialist Name: Liza Gomez  Office Location: [address] [address]   Phone (if available): [phone]  Fax (if available): [phone]  Diagnosis: C90.01  Date of appointment (if scheduled): 12/15/2017 Stella Barrett please send approval  to [facility]

## 2018-03-07 DIAGNOSIS — I10 ESSENTIAL HYPERTENSION: ICD-10-CM

## 2018-03-07 RX ORDER — VALSARTAN AND HYDROCHLOROTHIAZIDE 320; 25 MG/1; MG/1
TABLET, FILM COATED ORAL
Qty: 90 TAB | Refills: 1 | OUTPATIENT
Start: 2018-03-07

## 2018-03-07 RX ORDER — VALSARTAN AND HYDROCHLOROTHIAZIDE 320; 25 MG/1; MG/1
TABLET, FILM COATED ORAL
Qty: 30 TAB | Refills: 0 | Status: SHIPPED | OUTPATIENT
Start: 2018-03-07 | End: 2018-03-16 | Stop reason: SDUPTHER

## 2018-03-07 NOTE — TELEPHONE ENCOUNTER
Spoke with patient. He states he will not be home for \"awhile. \"  Patient is currently at [facility] for stem cell treatment for multiple myeloma. Per patient they are harvesting his stem cells and he will begin chemo treatments on Monday 3/12/18. Per patient a 30 day supply of medication can be sent to [facility] on [address] and his wife can pick it up to bring to him.

## 2018-03-07 NOTE — TELEPHONE ENCOUNTER
30 day supply sent to [facility]. Further refills will require follow up with Dr. Georgia Hodges. Also triamterene-HCTZ is listed in his medical record.  Please clarify that he is not taking both of these, as they both contain HCTZ

## 2018-03-07 NOTE — TELEPHONE ENCOUNTER
Please advise that he is overdue for follow-up. If he needs a 30 days supply, please call in to local pharmacy.

## 2018-03-16 ENCOUNTER — OFFICE VISIT (OUTPATIENT)
Dept: FAMILY MEDICINE CLINIC | Age: 58
End: 2018-03-16

## 2018-03-16 VITALS
RESPIRATION RATE: 16 BRPM | SYSTOLIC BLOOD PRESSURE: 116 MMHG | WEIGHT: 218 LBS | DIASTOLIC BLOOD PRESSURE: 74 MMHG | OXYGEN SATURATION: 98 % | HEIGHT: 72 IN | TEMPERATURE: 98.7 F | BODY MASS INDEX: 29.53 KG/M2 | HEART RATE: 74 BPM

## 2018-03-16 DIAGNOSIS — E03.9 ACQUIRED HYPOTHYROIDISM: ICD-10-CM

## 2018-03-16 DIAGNOSIS — I10 ESSENTIAL HYPERTENSION: Primary | ICD-10-CM

## 2018-03-16 DIAGNOSIS — K21.9 GASTROESOPHAGEAL REFLUX DISEASE, ESOPHAGITIS PRESENCE NOT SPECIFIED: ICD-10-CM

## 2018-03-16 DIAGNOSIS — J30.9 ALLERGIC RHINITIS, UNSPECIFIED CHRONICITY, UNSPECIFIED SEASONALITY, UNSPECIFIED TRIGGER: ICD-10-CM

## 2018-03-16 DIAGNOSIS — C90.00 MULTIPLE MYELOMA NOT HAVING ACHIEVED REMISSION (HCC): ICD-10-CM

## 2018-03-16 PROBLEM — C90.01 MULTIPLE MYELOMA IN REMISSION (HCC): Status: RESOLVED | Noted: 2017-01-16 | Resolved: 2018-03-16

## 2018-03-16 RX ORDER — VALSARTAN AND HYDROCHLOROTHIAZIDE 320; 25 MG/1; MG/1
TABLET, FILM COATED ORAL
Qty: 90 TAB | Refills: 1 | Status: SHIPPED | OUTPATIENT
Start: 2018-03-16 | End: 2018-09-13

## 2018-03-16 RX ORDER — VALSARTAN AND HYDROCHLOROTHIAZIDE 320; 25 MG/1; MG/1
TABLET, FILM COATED ORAL
Qty: 90 TAB | Refills: 1 | Status: SHIPPED | OUTPATIENT
Start: 2018-03-16 | End: 2018-03-16 | Stop reason: SDUPTHER

## 2018-03-16 NOTE — PROGRESS NOTES
1. Have you been to the ER, urgent care clinic since your last visit? Hospitalized since your last visit? No    2. Have you seen or consulted any other health care providers outside of the [address] [place] [address] since your last visit? Include any pap smears or colon screening.  [facility] (Dr. Brando Troy) Pain MGMT Dr. Chen Claudio and Stem Cell (Dr. Jones)Oncology (Dr. Brando Troy) Pain MGMT Dr. Chen Claudio and Stem Cell (Dr. Zora Araujo)

## 2018-09-13 ENCOUNTER — OFFICE VISIT (OUTPATIENT)
Dept: FAMILY MEDICINE CLINIC | Age: 58
End: 2018-09-13

## 2018-09-13 VITALS
HEART RATE: 81 BPM | OXYGEN SATURATION: 98 % | RESPIRATION RATE: 16 BRPM | BODY MASS INDEX: 28.04 KG/M2 | TEMPERATURE: 98.5 F | HEIGHT: 72 IN | WEIGHT: 207 LBS | DIASTOLIC BLOOD PRESSURE: 94 MMHG | SYSTOLIC BLOOD PRESSURE: 150 MMHG

## 2018-09-13 DIAGNOSIS — C90.00 MULTIPLE MYELOMA, REMISSION STATUS UNSPECIFIED (HCC): ICD-10-CM

## 2018-09-13 DIAGNOSIS — I10 ESSENTIAL HYPERTENSION: Primary | ICD-10-CM

## 2018-09-13 DIAGNOSIS — I48.0 PAROXYSMAL A-FIB (HCC): ICD-10-CM

## 2018-09-13 RX ORDER — METOPROLOL TARTRATE 25 MG/1
25 TABLET, FILM COATED ORAL 2 TIMES DAILY
COMMUNITY
Start: 2018-04-23

## 2018-09-13 RX ORDER — LOSARTAN POTASSIUM 100 MG/1
100 TABLET ORAL DAILY
Qty: 90 TAB | Refills: 1 | Status: SHIPPED | OUTPATIENT
Start: 2018-09-13

## 2018-09-13 NOTE — PROGRESS NOTES
SUBJECTIVE Chief Complaint Patient presents with  Follow Up Chronic Condition  
  htn Here for general follow-up, lab review, and med refills. Since his last visit he has had stem cell transplant at [facility] for his multiple myeloma. He has done well and has a PET scan scheduled soon. He is on chemotherapy agents. He had a-fib while in the hospital that has since resolved. He was placed on metoprolol. He says he has had a-fib in the past and was cardioverted. He is on antivirals for recurrent shingles prevention which he has had a few times. He is here for management of hypertension. We reviewed their cardiac risk factors. The patient denies any chest pain or chest tightness. Denies any dyspnea upon exertion. He was on valsartan / HCTZ in the past.  He says he checked to make sure the prior medication was not part of the recall. He sees pain management and is not having full control of his pain. He also says that his GERD is well controlled on chronic PPI therapy. He is moving to [place] in Oct to help take care of his in-laws. He sold his house locally. OBJECTIVE Blood pressure (!) 150/94, pulse 81, temperature 98.5 °F (36.9 °C), temperature source Oral, resp. rate 16, height 6' (1.829 m), weight 207 lb (93.9 kg), SpO2 98 %. General:  Alert, cooperative, well appearing, in no apparent distress. CV:  The heart sounds are regular in rate and rhythm. There is a normal S1 and S2. There or no murmurs. Lungs: Inspiratory and expiratory efforts are full and unlabored. Lung sounds are clear and equal to auscultation throughout all lung fields without wheezing, rales, or rhonchi. Ext: no swelling. Psych: normal affect. Mood good. Oriented x 3. Judgement and insight intact. ASSESSMENT / PLAN 
  ICD-10-CM ICD-9-CM 1. Essential hypertension I10 401.9 2.  Paroxysmal A-fib (HCC) I48.0 427.31   
 3. Multiple myeloma, remission status unspecified (HCC) C90.00 203.00   
4. Body mass index (BMI) of 25.0 to 29.9 E66.3 278.02   
 
HTN - uncontrolled. Cont current care with metoprolol but add losartan 100mg daily. Paroxysmal A-fib - seems resolved. Cont metoprolol for now. I have suggested having a cardiologist due to his recurrent a-fib but I will defer to his new PCP in [place] once he establishes. Multiple myeloma - continue per oncology. He will need to establish with a new oncologist in [place]. BMI>25 - stay active. Advised to have PSA and other annual labs upon arrival in [place] with new PCP. Establish as soon as he arrives. All chart history elements were reviewed by me at the time of the visit even though marked at time of note closure. Patient understands our medical plan. Patient has provided input and agrees with goals. Alternatives have been explained and offered. All questions answered. The patient is to call if condition worsens or fails to improve. Follow-up Disposition: 
Return with new PCP in [place].

## 2018-09-13 NOTE — PATIENT INSTRUCTIONS

## 2018-09-13 NOTE — PROGRESS NOTES
1. Have you been to the ER, urgent care clinic since your last visit? Hospitalized since your last visit? No 
 
2. Have you seen or consulted any other health care providers outside of the [facility] since your last visit? Include any pap smears or colon screening. Yes,[facility] routine follow up

## 2018-09-13 NOTE — MR AVS SNAPSHOT
[address] [address] [address] [place] [address] 
[phone] Patient: Rae Aparicio MRN: [id number] CPF:[birth year] Visit Information Date & Time Provider Department Dept. Phone Encounter #  
 9/13/2018  1:30 PM Christine Quispe, [facility] [phone] [id number] Follow-up Instructions Return with new PCP in [place]. Upcoming Health Maintenance Date Due Pneumococcal 19-64 Highest Risk (2 of 3 - PCV13) 7/18/2018 Influenza Age 5 to Adult 8/1/2018 DTaP/Tdap/Td series (2 - Td) 7/6/2025 Allergies as of 9/13/2018  Review Complete On: 9/13/2018 By: Christine Quispe MD  
  
 Severity Noted Reaction Type Reactions Ativan [Lorazepam]  01/26/2016    Other (comments) Suicidal thoughts Current Immunizations  Reviewed on 3/16/2018 Name Date Tdap 7/6/2015 Not reviewed this visit You Were Diagnosed With   
  
 Codes Comments Essential hypertension    -  Primary ICD-10-CM: I10 
ICD-9-CM: 401.9 Paroxysmal A-fib (HCC)     ICD-10-CM: I48.0 ICD-9-CM: 427.31   
 Multiple myeloma, remission status unspecified (HCC)     ICD-10-CM: C90.00 ICD-9-CM: 203.00 Vitals BP Pulse Temp Resp Height(growth percentile) Weight(growth percentile) (!) 150/94 (BP 1 Location: Left arm, BP Patient Position: Sitting) 81 98.5 °F (36.9 °C) (Oral) 16 6' (1.829 m) 207 lb (93.9 kg) SpO2 BMI Smoking Status 98% 28.07 kg/m2 Former Smoker Vitals History BMI and BSA Data Body Mass Index Body Surface Area 28.07 kg/m 2 2.18 m 2 Preferred Pharmacy Pharmacy Name Phone [facility] [address] [facility], [address] [facility] [phone] Your Updated Medication List  
  
   
This list is accurate as of 9/13/18  2:05 PM.  Always use your most recent med list.  
  
  
  
  
 acyclovir 400 mg tablet Commonly known as:  ZOVIRAX Take 400 mg by mouth two (2) times a day. amitriptyline 25 mg tablet Commonly known as:  ELAVIL Take 50 mg by mouth nightly. aspirin delayed-release 81 mg tablet Take 81 mg by mouth daily. HYDROcodone-acetaminophen 7.5-325 mg per tablet Commonly known as:  Benetta Pock Take 1-2 Tabs by mouth every six (6) hours as needed. lansoprazole 15 mg capsule Commonly known as:  PREVACID Take 15 mg by mouth two (2) times a day. losartan 100 mg tablet Commonly known as:  COZAAR Take 1 Tab by mouth daily. metoprolol tartrate 25 mg tablet Commonly known as:  LOPRESSOR Take 25 mg by mouth two (2) times a day. REVLIMID 25 mg Cap Generic drug:  lenalidomide Take 25 mg by mouth daily. 1 tab daily x 2 weeks then off for 1 week  
  
 tiZANidine 4 mg tablet Commonly known as:  Attica Mule Take 4 mg by mouth two (2) times daily as needed. Prescriptions Sent to Pharmacy Refills  
 losartan (COZAAR) 100 mg tablet 1 Sig: Take 1 Tab by mouth daily. Class: Normal  
 Pharmacy: [id number] [address] [place] [address] #: [phone] Route: Oral  
  
Follow-up Instructions Return with new PCP in [place]. Patient Instructions A Healthy Lifestyle: Care Instructions Your Care Instructions A healthy lifestyle can help you feel good, stay at a healthy weight, and have plenty of energy for both work and play. A healthy lifestyle is something you can share with your whole family. A healthy lifestyle also can lower your risk for serious health problems, such as high blood pressure, heart disease, and diabetes. You can follow a few steps listed below to improve your health and the health of your family. Follow-up care is a key part of your treatment and safety.  Be sure to make and go to all appointments, and call your doctor if you are having problems. It's also a good idea to know your test results and keep a list of the medicines you take. How can you care for yourself at home? · Do not eat too much sugar, fat, or fast foods. You can still have dessert and treats now and then. The goal is moderation. · Start small to improve your eating habits. Pay attention to portion sizes, drink less juice and soda pop, and eat more fruits and vegetables. ¨ Eat a healthy amount of food. A 3-ounce serving of meat, for example, is about the size of a deck of cards. Fill the rest of your plate with vegetables and whole grains. ¨ Limit the amount of soda and sports drinks you have every day. Drink more water when you are thirsty. ¨ Eat at least 5 servings of fruits and vegetables every day. It may seem like a lot, but it is not hard to reach this goal. A serving or helping is 1 piece of fruit, 1 cup of vegetables, or 2 cups of leafy, raw vegetables. Have an apple or some carrot sticks as an afternoon snack instead of a candy bar. Try to have fruits and/or vegetables at every meal. 
· Make exercise part of your daily routine. You may want to start with simple activities, such as walking, bicycling, or slow swimming. Try to be active 30 to 60 minutes every day. You do not need to do all 30 to 60 minutes all at once. For example, you can exercise 3 times a day for 10 or 20 minutes. Moderate exercise is safe for most people, but it is always a good idea to talk to your doctor before starting an exercise program. 
· Keep moving. Joan Orellana the lawn, work in the garden, or [employer]. Take the stairs instead of the elevator at work. · If you smoke, quit. People who smoke have an increased risk for heart attack, stroke, cancer, and other lung illnesses. Quitting is hard, but there are ways to boost your chance of quitting tobacco for good. ¨ Use nicotine gum, patches, or lozenges. ¨ Ask your doctor about stop-smoking programs and medicines. ¨ Keep trying. In addition to reducing your risk of diseases in the future, you will notice some benefits soon after you stop using tobacco. If you have shortness of breath or asthma symptoms, they will likely get better within a few weeks after you quit. · Limit how much alcohol you drink. Moderate amounts of alcohol (up to 2 drinks a day for men, 1 drink a day for women) are okay. But drinking too much can lead to liver problems, high blood pressure, and other health problems. Family health If you have a family, there are many things you can do together to improve your health. · Eat meals together as a family as often as possible. · Eat healthy foods. This includes fruits, vegetables, lean meats and dairy, and whole grains. · Include your family in your fitness plan. Most people think of activities such as jogging or tennis as the way to fitness, but there are many ways you and your family can be more active. Anything that makes you breathe hard and gets your heart pumping is exercise. Here are some tips: 
¨ Walk to do errands or to take your child to school or the bus. ¨ Go for a family bike ride after dinner instead of watching TV. Where can you learn more? Go to http://moises-venus.info/. Enter X585 in the search box to learn more about \"A Healthy Lifestyle: Care Instructions. \" Current as of: December 7, 2017 Content Version: 11.7 © 5528-8591 [employer]. Care instructions adapted under license by [employer] (which disclaims liability or warranty for this information). If you have questions about a medical condition or this instruction, always ask your healthcare professional. [place] [address] any warranty or liability for your use of this information. Introducing [facility]! Dear Cameron Loyd: Thank you for requesting a [employer] account. Our records indicate that you already have an active [employer] account.   You can access your account anytime at [email or web address] Did you know that you can access your hospital and ER discharge instructions at any time in [employer]? You can also review all of your test results from your hospital stay or ER visit. Additional Information If you have questions, please visit the Frequently Asked Questions section of the [employer] website at [email or web address] Remember, [employer] is NOT to be used for urgent needs. For medical emergencies, dial 911. Now available from your iPhone and Android! Please provide this summary of care documentation to your next provider. Your primary care clinician is listed as Maureen Olvera. If you have any questions after today's visit, please call [phone].

## 2018-10-04 ENCOUNTER — HOSPITAL ENCOUNTER (OUTPATIENT)
Dept: PET IMAGING | Age: 58
Discharge: HOME | End: 2018-10-04
Attending: INTERNAL MEDICINE
Payer: OTHER GOVERNMENT

## 2018-10-04 DIAGNOSIS — C90.01 MULTIPLE MYELOMA IN REMISSION (HCC): ICD-10-CM

## 2018-10-04 PROCEDURE — A9552 F18 FDG: HCPCS

## 2019-07-12 ENCOUNTER — Encounter (OUTPATIENT)
Dept: URBAN - METROPOLITAN AREA CLINIC 71 | Facility: CLINIC | Age: 59
End: 2019-07-12
Payer: COMMERCIAL

## 2019-07-12 PROCEDURE — 92002 INTRM OPH EXAM NEW PATIENT: CPT | Performed by: OPHTHALMOLOGY

## 2019-07-12 PROCEDURE — 99202 OFFICE O/P NEW SF 15 MIN: CPT | Performed by: OPHTHALMOLOGY

## 2019-07-25 ENCOUNTER — Encounter (OUTPATIENT)
Dept: URBAN - METROPOLITAN AREA CLINIC 72 | Facility: CLINIC | Age: 59
End: 2019-07-25
Payer: OTHER GOVERNMENT

## 2019-07-25 PROCEDURE — 92250 FUNDUS PHOTOGRAPHY W/I&R: CPT | Performed by: OPTOMETRIST

## 2019-07-25 PROCEDURE — 99215 OFFICE O/P EST HI 40 MIN: CPT | Performed by: OPTOMETRIST

## 2020-11-10 ENCOUNTER — OFFICE VISIT (OUTPATIENT)
Dept: URBAN - METROPOLITAN AREA CLINIC 71 | Facility: CLINIC | Age: 60
End: 2020-11-10
Payer: OTHER GOVERNMENT

## 2020-11-10 PROCEDURE — 99213 OFFICE O/P EST LOW 20 MIN: CPT | Performed by: OPHTHALMOLOGY

## 2020-11-10 ASSESSMENT — INTRAOCULAR PRESSURE: OS: 17

## 2020-11-10 NOTE — IMPRESSION/PLAN
Impression: Age-related nuclear cataract, left eye: H25.12. Plan: No treatment currently recommended. The patient will monitor vision changes and contact us with any decrease in vision.
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 Plan: Possible cause of visual complaints. Recommend patient to see PCP for a complete vascular work up. Informed patient that if any stroke symptoms are experienced, patient needs to go to ER.  
Will have patient return in 3 months for a recheck
None

## 2021-02-16 ENCOUNTER — OFFICE VISIT (OUTPATIENT)
Dept: URBAN - METROPOLITAN AREA CLINIC 71 | Facility: CLINIC | Age: 61
End: 2021-02-16
Payer: OTHER GOVERNMENT

## 2021-02-16 DIAGNOSIS — G45.3 AMAUROSIS FUGAX: Primary | ICD-10-CM

## 2021-02-16 DIAGNOSIS — H25.12 AGE-RELATED NUCLEAR CATARACT, LEFT EYE: ICD-10-CM

## 2021-02-16 PROCEDURE — 99213 OFFICE O/P EST LOW 20 MIN: CPT | Performed by: OPHTHALMOLOGY

## 2021-02-16 ASSESSMENT — INTRAOCULAR PRESSURE: OS: 19

## 2021-02-16 NOTE — IMPRESSION/PLAN
Impression: Age-related nuclear cataract, left eye: H25.12. Plan: No treatment currently recommended. The patient will monitor vision changes and contact us with any decrease in vision. Patient to return annually for dilated exams.

## 2021-02-16 NOTE — IMPRESSION/PLAN
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 Plan: Patient has only had one episode since last visit. Monitor. Patient to go to ER if any more episodes occur and have a complete stroke work up.

## 2021-05-06 ENCOUNTER — OFFICE VISIT (OUTPATIENT)
Dept: URBAN - METROPOLITAN AREA CLINIC 71 | Facility: CLINIC | Age: 61
End: 2021-05-06
Payer: COMMERCIAL

## 2021-05-06 DIAGNOSIS — H52.4 PRESBYOPIA: Primary | ICD-10-CM

## 2021-05-06 PROCEDURE — 92014 COMPRE OPH EXAM EST PT 1/>: CPT | Performed by: OPTOMETRIST

## 2021-05-06 ASSESSMENT — INTRAOCULAR PRESSURE: OS: 17

## 2021-05-06 ASSESSMENT — VISUAL ACUITY: OS: 20/20

## 2021-05-06 NOTE — IMPRESSION/PLAN
Impression: Presbyopia: H52.4. Left. Plan: Presbyopia is the inability to focus on objects (ie: accommodate) due to the loss of flexibility of your natural lens. Presbyopia occurs with age. Reading glasses, bifocals, trifocals or contacts can be helpful. Contact the office if difficulty focusing persists despite corrective eye wear. New glasses RX given today for progressive.  
Continue to follow annually for vision exam.

## 2023-10-13 ENCOUNTER — TRANSCRIBE ORDERS (OUTPATIENT)
Facility: HOSPITAL | Age: 63
End: 2023-10-13

## 2023-10-13 DIAGNOSIS — C90.01 MULTIPLE MYELOMA IN REMISSION (HCC): ICD-10-CM

## 2023-10-13 DIAGNOSIS — R60.0 EDEMA LEG: Primary | ICD-10-CM

## 2023-10-18 ENCOUNTER — HOSPITAL ENCOUNTER (OUTPATIENT)
Facility: HOSPITAL | Age: 63
Discharge: HOME | End: 2023-10-20
Attending: INTERNAL MEDICINE
Payer: OTHER GOVERNMENT

## 2023-10-18 DIAGNOSIS — C90.01 MULTIPLE MYELOMA IN REMISSION (HCC): ICD-10-CM

## 2023-10-18 DIAGNOSIS — R60.0 EDEMA LEG: ICD-10-CM

## 2023-10-18 PROCEDURE — 93970 EXTREMITY STUDY: CPT

## 2024-05-29 ENCOUNTER — OFFICE VISIT (OUTPATIENT)
Dept: URBAN - METROPOLITAN AREA CLINIC 71 | Facility: CLINIC | Age: 64
End: 2024-05-29
Payer: OTHER GOVERNMENT

## 2024-05-29 DIAGNOSIS — H25.12 AGE-RELATED NUCLEAR CATARACT, LEFT EYE: Primary | ICD-10-CM

## 2024-05-29 DIAGNOSIS — H52.4 PRESBYOPIA: ICD-10-CM

## 2024-05-29 PROCEDURE — 99203 OFFICE O/P NEW LOW 30 MIN: CPT | Performed by: OPTOMETRIST

## 2024-05-29 ASSESSMENT — INTRAOCULAR PRESSURE
OD: 0
OS: 17

## 2024-05-29 ASSESSMENT — VISUAL ACUITY: OS: 20/20
